# Patient Record
Sex: FEMALE | URBAN - METROPOLITAN AREA
[De-identification: names, ages, dates, MRNs, and addresses within clinical notes are randomized per-mention and may not be internally consistent; named-entity substitution may affect disease eponyms.]

---

## 2019-05-30 ENCOUNTER — APPOINTMENT (RX ONLY)
Dept: URBAN - METROPOLITAN AREA CLINIC 15 | Facility: CLINIC | Age: 69
Setting detail: DERMATOLOGY
End: 2019-05-30

## 2019-05-30 ENCOUNTER — RX ONLY (OUTPATIENT)
Age: 69
Setting detail: RX ONLY
End: 2019-05-30

## 2019-05-30 DIAGNOSIS — L85.3 XEROSIS CUTIS: ICD-10-CM

## 2019-05-30 DIAGNOSIS — L57.8 OTHER SKIN CHANGES DUE TO CHRONIC EXPOSURE TO NONIONIZING RADIATION: ICD-10-CM

## 2019-05-30 DIAGNOSIS — L23.89 ALLERGIC CONTACT DERMATITIS DUE TO OTHER AGENTS: ICD-10-CM

## 2019-05-30 PROBLEM — J45.909 UNSPECIFIED ASTHMA, UNCOMPLICATED: Status: ACTIVE | Noted: 2019-05-30

## 2019-05-30 PROBLEM — M12.9 ARTHROPATHY, UNSPECIFIED: Status: ACTIVE | Noted: 2019-05-30

## 2019-05-30 PROBLEM — I10 ESSENTIAL (PRIMARY) HYPERTENSION: Status: ACTIVE | Noted: 2019-05-30

## 2019-05-30 PROBLEM — F32.9 MAJOR DEPRESSIVE DISORDER, SINGLE EPISODE, UNSPECIFIED: Status: ACTIVE | Noted: 2019-05-30

## 2019-05-30 PROCEDURE — ? ADDITIONAL NOTES

## 2019-05-30 PROCEDURE — ? PRESCRIPTION

## 2019-05-30 PROCEDURE — ? COUNSELING

## 2019-05-30 PROCEDURE — ? TREATMENT REGIMEN

## 2019-05-30 PROCEDURE — 99203 OFFICE O/P NEW LOW 30 MIN: CPT

## 2019-05-30 RX ORDER — TRIAMCINOLONE ACETONIDE 1 MG/G
CREAM TOPICAL
Qty: 1 | Refills: 0 | Status: ERX | COMMUNITY
Start: 2019-05-30

## 2019-05-30 RX ORDER — HYDROCORTISONE 25 MG/G
CREAM TOPICAL
Qty: 1 | Refills: 2 | Status: ERX | COMMUNITY
Start: 2019-05-30

## 2019-05-30 RX ADMIN — HYDROCORTISONE 2X DAILY: 25 CREAM TOPICAL at 00:00

## 2019-05-30 ASSESSMENT — LOCATION DETAILED DESCRIPTION DERM
LOCATION DETAILED: RIGHT MEDIAL BREAST 1-2:00 REGION
LOCATION DETAILED: LEFT INFERIOR CENTRAL MALAR CHEEK
LOCATION DETAILED: RIGHT FOREHEAD
LOCATION DETAILED: LEFT DISTAL PRETIBIAL REGION
LOCATION DETAILED: LEFT ANKLE
LOCATION DETAILED: MIDDLE STERNUM
LOCATION DETAILED: SUPERIOR THORACIC SPINE
LOCATION DETAILED: RIGHT CENTRAL MALAR CHEEK

## 2019-05-30 ASSESSMENT — LOCATION SIMPLE DESCRIPTION DERM
LOCATION SIMPLE: RIGHT BREAST
LOCATION SIMPLE: LEFT ANKLE
LOCATION SIMPLE: RIGHT FOREHEAD
LOCATION SIMPLE: CHEST
LOCATION SIMPLE: LEFT CHEEK
LOCATION SIMPLE: UPPER BACK
LOCATION SIMPLE: RIGHT CHEEK
LOCATION SIMPLE: LEFT PRETIBIAL REGION

## 2019-05-30 ASSESSMENT — LOCATION ZONE DERM
LOCATION ZONE: LEG
LOCATION ZONE: TRUNK
LOCATION ZONE: FACE

## 2019-05-30 NOTE — HPI: RASH
What Type Of Note Output Would You Prefer (Optional)?: Standard Output
How Severe Is Your Rash?: mild
Is This A New Presentation, Or A Follow-Up?: Rash
Additional History: Patient states rash started after starting telmisartan \\n\\nPatient started telmisartan 40mg approximately 2.5 months ago for high blood pressure.

## 2019-05-30 NOTE — PROCEDURE: ADDITIONAL NOTES
Detail Level: Simple
Additional Notes: Zinc Oxide spf\\nWill re eval at skin check to see if any underlying Actinic damage

## 2019-05-30 NOTE — PROCEDURE: TREATMENT REGIMEN
Detail Level: Zone
Initiate Treatment: Triamcinolone 0.1% cream twice a day x 2 weeks\\nAvoid hot water\\nCeraVe cream twice a day \\nDove soap

## 2019-06-27 ENCOUNTER — APPOINTMENT (RX ONLY)
Dept: URBAN - METROPOLITAN AREA CLINIC 32 | Facility: CLINIC | Age: 69
Setting detail: DERMATOLOGY
End: 2019-06-27

## 2019-06-27 DIAGNOSIS — L23.89 ALLERGIC CONTACT DERMATITIS DUE TO OTHER AGENTS: ICD-10-CM

## 2019-06-27 DIAGNOSIS — L21.8 OTHER SEBORRHEIC DERMATITIS: ICD-10-CM

## 2019-06-27 PROCEDURE — 99212 OFFICE O/P EST SF 10 MIN: CPT

## 2019-06-27 PROCEDURE — ? COUNSELING

## 2019-06-27 PROCEDURE — ? TREATMENT REGIMEN

## 2019-06-27 ASSESSMENT — LOCATION DETAILED DESCRIPTION DERM
LOCATION DETAILED: LEFT DORSAL FOOT
LOCATION DETAILED: RIGHT DORSAL FOOT
LOCATION DETAILED: SUPERIOR MID FOREHEAD
LOCATION DETAILED: LEFT ANKLE
LOCATION DETAILED: RIGHT DISTAL PRETIBIAL REGION

## 2019-06-27 ASSESSMENT — LOCATION SIMPLE DESCRIPTION DERM
LOCATION SIMPLE: LEFT ANKLE
LOCATION SIMPLE: RIGHT FOOT
LOCATION SIMPLE: SUPERIOR FOREHEAD
LOCATION SIMPLE: LEFT FOOT
LOCATION SIMPLE: RIGHT PRETIBIAL REGION

## 2019-06-27 ASSESSMENT — LOCATION ZONE DERM
LOCATION ZONE: FEET
LOCATION ZONE: FACE
LOCATION ZONE: LEG

## 2019-06-27 NOTE — PROCEDURE: TREATMENT REGIMEN
Initiate Treatment: Triamcinolone 0.1% cream  increase to twice a day x 2 weeks\\nAvoid hot water\\nCeraVe cream twice a day \\nDove soap
Plan: Patch testing
Detail Level: Zone
Continue Regimen: Thin layer of hydrocortisone 2.5% cream twice a day as needed scaling

## 2019-06-27 NOTE — PROCEDURE: MIPS QUALITY
Detail Level: Detailed
Quality 474: Zoster Vaccination Status: Shingrix vaccine was not administered for reasons documented by clinician (e.g. patient administered vaccine other than Shingrix, patient allergy or other medical reasons, patient declined or other patient reasons, vaccine not available or other system reasons)
Additional Notes: 0/10 pain\\nNo shingles vaccine
Quality 130: Documentation Of Current Medications In The Medical Record: Current Medications Documented
Quality 131: Pain Assessment And Follow-Up: Pain assessment using a standardized tool is documented as negative, no follow-up plan required

## 2019-07-09 ENCOUNTER — APPOINTMENT (RX ONLY)
Dept: URBAN - METROPOLITAN AREA CLINIC 32 | Facility: CLINIC | Age: 69
Setting detail: DERMATOLOGY
End: 2019-07-09

## 2019-07-09 DIAGNOSIS — L23.9 ALLERGIC CONTACT DERMATITIS, UNSPECIFIED CAUSE: ICD-10-CM

## 2019-07-09 PROCEDURE — ? PATCH TESTING

## 2019-07-09 PROCEDURE — ? COUNSELING

## 2019-07-09 PROCEDURE — 95044 PATCH/APPLICATION TESTS: CPT

## 2019-07-09 ASSESSMENT — LOCATION DETAILED DESCRIPTION DERM
LOCATION DETAILED: RIGHT MEDIAL UPPER BACK
LOCATION DETAILED: RIGHT LATERAL UPPER BACK
LOCATION DETAILED: LEFT MID-UPPER BACK

## 2019-07-09 ASSESSMENT — LOCATION SIMPLE DESCRIPTION DERM
LOCATION SIMPLE: RIGHT UPPER BACK
LOCATION SIMPLE: LEFT UPPER BACK

## 2019-07-09 ASSESSMENT — LOCATION ZONE DERM: LOCATION ZONE: TRUNK

## 2019-07-09 NOTE — PROCEDURE: MIPS QUALITY
Quality 474: Zoster Vaccination Status: Shingrix vaccine was not administered for reasons documented by clinician (e.g. patient administered vaccine other than Shingrix, patient allergy or other medical reasons, patient declined or other patient reasons, vaccine not available or other system reasons)
Detail Level: Detailed
Quality 130: Documentation Of Current Medications In The Medical Record: Current Medications Documented
Quality 131: Pain Assessment And Follow-Up: Pain assessment using a standardized tool is documented as negative, no follow-up plan required
Additional Notes: 0/10 pain\\nNo shingles vaccine

## 2019-07-09 NOTE — PROCEDURE: PATCH TESTING
Post-Care Instructions: I reviewed with the patient in detail post-care instructions. Patient should not sweat, pick at, or get the patches wet for 48 hours.
Number Of Patches (Maximum Allowable Per Dos By Cms Is 90):  Jim Mcarthur
Detail Level: Detailed
Consent: Written consent obtained, risks reviewed including but not limited to rash, itching, allergic reaction, systemic rash, remote possiblity of anaphylaxis to allergen.

## 2019-07-11 ENCOUNTER — APPOINTMENT (RX ONLY)
Dept: URBAN - METROPOLITAN AREA CLINIC 32 | Facility: CLINIC | Age: 69
Setting detail: DERMATOLOGY
End: 2019-07-11

## 2019-07-11 DIAGNOSIS — L23.9 ALLERGIC CONTACT DERMATITIS, UNSPECIFIED CAUSE: ICD-10-CM

## 2019-07-11 PROCEDURE — 99212 OFFICE O/P EST SF 10 MIN: CPT

## 2019-07-11 PROCEDURE — ? COUNSELING

## 2019-07-11 PROCEDURE — ? TRUE TEST READING

## 2019-07-11 NOTE — PROCEDURE: MIPS QUALITY
Quality 130: Documentation Of Current Medications In The Medical Record: Current Medications Documented
Quality 474: Zoster Vaccination Status: Shingrix vaccine was not administered for reasons documented by clinician (e.g. patient administered vaccine other than Shingrix, patient allergy or other medical reasons, patient declined or other patient reasons, vaccine not available or other system reasons)
Detail Level: Detailed
Quality 131: Pain Assessment And Follow-Up: Pain assessment using a standardized tool is documented as negative, no follow-up plan required
Additional Notes: 0/10 pain\\nNo shingles vaccine

## 2019-07-11 NOTE — PROCEDURE: TRUE TEST READING
Black Rubber Mix: no reaction
Number Of Patches Read:  Jim Mcarthur
Show Negative Results In The Note?: No
Detail Level: Zone
Show Allergen Counseling In The Note?: Yes
What Reading Time Point?: 48 hour